# Patient Record
Sex: FEMALE | Race: WHITE | NOT HISPANIC OR LATINO | Employment: OTHER | URBAN - METROPOLITAN AREA
[De-identification: names, ages, dates, MRNs, and addresses within clinical notes are randomized per-mention and may not be internally consistent; named-entity substitution may affect disease eponyms.]

---

## 2018-02-05 ENCOUNTER — HOSPITAL ENCOUNTER (EMERGENCY)
Facility: MEDICAL CENTER | Age: 83
End: 2018-02-05
Attending: EMERGENCY MEDICINE
Payer: MEDICARE

## 2018-02-05 ENCOUNTER — APPOINTMENT (OUTPATIENT)
Dept: RADIOLOGY | Facility: MEDICAL CENTER | Age: 83
End: 2018-02-05
Attending: EMERGENCY MEDICINE
Payer: MEDICARE

## 2018-02-05 VITALS
TEMPERATURE: 98.7 F | RESPIRATION RATE: 16 BRPM | WEIGHT: 126 LBS | OXYGEN SATURATION: 97 % | SYSTOLIC BLOOD PRESSURE: 180 MMHG | HEART RATE: 79 BPM | DIASTOLIC BLOOD PRESSURE: 67 MMHG | HEIGHT: 61 IN | BODY MASS INDEX: 23.79 KG/M2

## 2018-02-05 DIAGNOSIS — S42.201A CLOSED FRACTURE OF PROXIMAL END OF RIGHT HUMERUS, UNSPECIFIED FRACTURE MORPHOLOGY, INITIAL ENCOUNTER: ICD-10-CM

## 2018-02-05 PROCEDURE — 73030 X-RAY EXAM OF SHOULDER: CPT | Mod: RT

## 2018-02-05 PROCEDURE — 99284 EMERGENCY DEPT VISIT MOD MDM: CPT

## 2018-02-05 RX ORDER — HYDROCODONE BITARTRATE AND ACETAMINOPHEN 5; 325 MG/1; MG/1
1-2 TABLET ORAL EVERY 4 HOURS PRN
Qty: 12 TAB | Refills: 0 | Status: SHIPPED | OUTPATIENT
Start: 2018-02-05 | End: 2018-02-09

## 2018-02-05 ASSESSMENT — PAIN SCALES - GENERAL: PAINLEVEL_OUTOF10: 8

## 2018-02-05 NOTE — ED TRIAGE NOTES
Wheeled to triage  Chief Complaint   Patient presents with   • T-5000 FALL     slipped and fell, hit her R shoulder, unalbe to lift arm, hx osteoperosis     Pt was in the hospital visiting her family and fell. Unable to lift R arm, sensation in tact.

## 2018-02-05 NOTE — ED PROVIDER NOTES
"ED Provider Note    CHIEF COMPLAINT  Chief Complaint   Patient presents with   • T-5000 FALL     slipped and fell, hit her R shoulder, unalbe to lift arm, hx osteoperosis       HPI  Tiffani Magana is a 89 y.o. female who presents after a trip and fall onto her right shoulder. She has pain with any movement at the shoulder but is fairly comfortable without better but she denies any other injury she denies any symptoms prior to the fall. She does have a history of previous shoulder injury what sounds like a proximal humerus fracture.    REVIEW OF SYSTEMS  See HPI for further details. ENT she denies head injury muscle skeletonized back pain and hip pain or other extremity injury All other systems are negative.    PAST MEDICAL HISTORY  No past medical history on file.    FAMILY HISTORY  No family history on file.    SOCIAL HISTORY  Social History     Social History   • Marital status:      Spouse name: N/A   • Number of children: N/A   • Years of education: N/A     Social History Main Topics   • Smoking status: Not on file   • Smokeless tobacco: Not on file   • Alcohol use Not on file   • Drug use: Unknown   • Sexual activity: Not on file     Other Topics Concern   • Not on file     Social History Narrative   • No narrative on file       SURGICAL HISTORY  Past Surgical History:   Procedure Laterality Date   • CERVICAL LAMINECTOMY POSTERIOR     • CHOLECYSTECTOMY     • HERNIA REPAIR     • HIP ORIF     • HYSTERECTOMY LAPAROSCOPY     • LUMBAR LAMINECTOMY DISKECTOMY         CURRENT MEDICATIONS  Home Medications    **Home medications have not yet been reviewed for this encounter**         ALLERGIES  Allergies   Allergen Reactions   • Aspirin    • Robaxin [Methocarbamol]      hallucinations       PHYSICAL EXAM  VITAL SIGNS: BP (!) 180/67   Pulse 79   Temp 37.1 °C (98.7 °F)   Resp 16   Ht 1.549 m (5' 1\")   Wt 57.2 kg (126 lb)   SpO2 97%   BMI 23.81 kg/m²     Constitutional: Well developed, Well nourished, No acute " distress, elderly   HENT: Normocephalic, Atraumatic, Bilateral external ears normal, mucous membranes moist, No oral exudates, Nose normal.   Eyes: PERRLA,  Conjunctiva and lids normal, No discharge.   Cardiovascular: Normal heart rate, Normal rhythm, No murmurs.   Thorax & Lungs: Normal breath sounds, No respiratory distress, No wheezing, or other abnormal breath sounds. No chest tenderness.   Abdomen: Bowel sounds normal, Soft, No tenderness, No masses, No pulsatile masses. No guarding.  Skin: Warm, Dry, No erythema, No rash.   Back: No tenderness, No CVA tenderness.  Extremities: Significant pain in the right shoulder with movement both actively and passively there appears to be significant swelling over the right shoulder series: Shoulder forward and sort of a positional more comfort there is no definite abnormality does not appear to be location neurovascular is intact  Musculoskeletal: Good range of motion in all major joints. No tenderness to palpation or major deformities, or injuries noted.   Neurologic: Alert & oriented x 3, Normal motor function, Normal sensory function, No focal deficits noted.         RADIOLOGY/PROCEDURES  DX-SHOULDER 2+ RIGHT   Final Result      1.  Comminuted and impacted fracture RIGHT proximal humerus involving surgical neck and head.   2.  No RIGHT shoulder dislocation.   3.  Probable subacute RIGHT anterior 5th and 6th rib fractures.   4.  Diffuse osteopenia.        COURSE & MEDICAL DECISION MAKING  Pertinent Labs & Imaging studies reviewed. (See chart for details)  Initial evaluation of x-rays by myself I felt there was possibly dislocation IV line was established in case we needed reduction however she does not appear to have dislocation seen appears to have an impacted fracture proximal humerus as discussed with the patient discussed with orthopedic surgeon who will see her tomorrow put her in a shoulder immobilizer prescribed pain medication. Patient and discussion with  patient about controlled substances p.m. P reviewed    FINAL IMPRESSION  1. Proximal humerus fracture   2.   3.       Electronically signed by: Rc Newell, 2/5/2018 9:43 PM

## 2018-02-06 NOTE — ED NOTES
Patient given discharge teaching and education. Verbalizes understanding. Given chance to ask questions, all questions answered. Educated patient of signs and symptoms to returned to ER, and educated patient they can return for any concerning symptoms. 1 prescriptions provided to patient. Education given to patient about medications. Patient states they have their belongings. Denies additional needs at this time. Reports pain is well controlled. Patient monitored every hour and PRN for safety and comfort. Patient out of department via wheel chair.

## 2019-12-05 ENCOUNTER — ANESTHESIA EVENT (OUTPATIENT)
Dept: SURGERY | Facility: MEDICAL CENTER | Age: 84
End: 2019-12-05
Payer: MEDICARE

## 2019-12-06 ENCOUNTER — ANESTHESIA (OUTPATIENT)
Dept: SURGERY | Facility: MEDICAL CENTER | Age: 84
End: 2019-12-06
Payer: MEDICARE

## 2019-12-06 ENCOUNTER — HOSPITAL ENCOUNTER (OUTPATIENT)
Facility: MEDICAL CENTER | Age: 84
End: 2019-12-06
Attending: OPHTHALMOLOGY | Admitting: OPHTHALMOLOGY
Payer: MEDICARE

## 2019-12-06 VITALS
OXYGEN SATURATION: 95 % | BODY MASS INDEX: 24.43 KG/M2 | HEART RATE: 81 BPM | TEMPERATURE: 99.1 F | SYSTOLIC BLOOD PRESSURE: 178 MMHG | HEIGHT: 61 IN | DIASTOLIC BLOOD PRESSURE: 71 MMHG | RESPIRATION RATE: 23 BRPM | WEIGHT: 129.41 LBS

## 2019-12-06 LAB
BASOPHILS # BLD AUTO: 1.2 % (ref 0–1.8)
BASOPHILS # BLD: 0.07 K/UL (ref 0–0.12)
BUN BLD-MCNC: 22 MG/DL (ref 8–22)
CA-I BLD ISE-SCNC: 1.26 MMOL/L (ref 1.1–1.3)
CHLORIDE BLD-SCNC: 105 MMOL/L (ref 96–112)
CO2 BLD-SCNC: 27 MMOL/L (ref 20–33)
CREAT BLD-MCNC: 0.9 MG/DL (ref 0.5–1.4)
EKG IMPRESSION: NORMAL
EOSINOPHIL # BLD AUTO: 0.12 K/UL (ref 0–0.51)
EOSINOPHIL NFR BLD: 2 % (ref 0–6.9)
ERYTHROCYTE [DISTWIDTH] IN BLOOD BY AUTOMATED COUNT: 48 FL (ref 35.9–50)
GLUCOSE BLD-MCNC: 88 MG/DL (ref 65–99)
HCT VFR BLD AUTO: 37.3 % (ref 37–47)
HCT VFR BLD CALC: 35 % (ref 37–47)
HGB BLD-MCNC: 11.7 G/DL (ref 12–16)
HGB BLD-MCNC: 11.9 G/DL (ref 12–16)
IMM GRANULOCYTES # BLD AUTO: 0.03 K/UL (ref 0–0.11)
IMM GRANULOCYTES NFR BLD AUTO: 0.5 % (ref 0–0.9)
LYMPHOCYTES # BLD AUTO: 1.8 K/UL (ref 1–4.8)
LYMPHOCYTES NFR BLD: 30.3 % (ref 22–41)
MCH RBC QN AUTO: 29.5 PG (ref 27–33)
MCHC RBC AUTO-ENTMCNC: 31.4 G/DL (ref 33.6–35)
MCV RBC AUTO: 94 FL (ref 81.4–97.8)
MONOCYTES # BLD AUTO: 0.48 K/UL (ref 0–0.85)
MONOCYTES NFR BLD AUTO: 8.1 % (ref 0–13.4)
NEUTROPHILS # BLD AUTO: 3.44 K/UL (ref 2–7.15)
NEUTROPHILS NFR BLD: 57.9 % (ref 44–72)
NRBC # BLD AUTO: 0 K/UL
NRBC BLD-RTO: 0 /100 WBC
PATHOLOGY CONSULT NOTE: NORMAL
PLATELET # BLD AUTO: 163 K/UL (ref 164–446)
PMV BLD AUTO: 9.5 FL (ref 9–12.9)
POTASSIUM BLD-SCNC: 4.6 MMOL/L (ref 3.6–5.5)
RBC # BLD AUTO: 3.97 M/UL (ref 4.2–5.4)
SODIUM BLD-SCNC: 139 MMOL/L (ref 135–145)
WBC # BLD AUTO: 5.9 K/UL (ref 4.8–10.8)

## 2019-12-06 PROCEDURE — 85014 HEMATOCRIT: CPT

## 2019-12-06 PROCEDURE — 700101 HCHG RX REV CODE 250: Performed by: OPHTHALMOLOGY

## 2019-12-06 PROCEDURE — 80047 BASIC METABLC PNL IONIZED CA: CPT

## 2019-12-06 PROCEDURE — 160025 RECOVERY II MINUTES (STATS): Performed by: OPHTHALMOLOGY

## 2019-12-06 PROCEDURE — 160041 HCHG SURGERY MINUTES - EA ADDL 1 MIN LEVEL 4: Performed by: OPHTHALMOLOGY

## 2019-12-06 PROCEDURE — 85025 COMPLETE CBC W/AUTO DIFF WBC: CPT

## 2019-12-06 PROCEDURE — 700105 HCHG RX REV CODE 258: Performed by: OPHTHALMOLOGY

## 2019-12-06 PROCEDURE — 501838 HCHG SUTURE GENERAL: Performed by: OPHTHALMOLOGY

## 2019-12-06 PROCEDURE — 160048 HCHG OR STATISTICAL LEVEL 1-5: Performed by: OPHTHALMOLOGY

## 2019-12-06 PROCEDURE — 88305 TISSUE EXAM BY PATHOLOGIST: CPT | Mod: 59

## 2019-12-06 PROCEDURE — 160009 HCHG ANES TIME/MIN: Performed by: OPHTHALMOLOGY

## 2019-12-06 PROCEDURE — 160047 HCHG PACU  - EA ADDL 30 MINS PHASE II: Performed by: OPHTHALMOLOGY

## 2019-12-06 PROCEDURE — 160046 HCHG PACU - 1ST 60 MINS PHASE II: Performed by: OPHTHALMOLOGY

## 2019-12-06 PROCEDURE — 160029 HCHG SURGERY MINUTES - 1ST 30 MINS LEVEL 4: Performed by: OPHTHALMOLOGY

## 2019-12-06 PROCEDURE — 700111 HCHG RX REV CODE 636 W/ 250 OVERRIDE (IP): Performed by: ANESTHESIOLOGY

## 2019-12-06 PROCEDURE — 93005 ELECTROCARDIOGRAM TRACING: CPT | Performed by: ANESTHESIOLOGY

## 2019-12-06 PROCEDURE — 93010 ELECTROCARDIOGRAM REPORT: CPT | Performed by: INTERNAL MEDICINE

## 2019-12-06 RX ORDER — TETRACAINE HYDROCHLORIDE 5 MG/ML
SOLUTION OPHTHALMIC
Status: DISCONTINUED
Start: 2019-12-06 | End: 2019-12-06 | Stop reason: HOSPADM

## 2019-12-06 RX ORDER — TOBRAMYCIN AND DEXAMETHASONE 3; 1 MG/ML; MG/ML
SUSPENSION/ DROPS OPHTHALMIC
Status: DISCONTINUED | OUTPATIENT
Start: 2019-12-06 | End: 2019-12-06 | Stop reason: HOSPADM

## 2019-12-06 RX ORDER — ERYTHROMYCIN 5 MG/G
OINTMENT OPHTHALMIC
Status: DISCONTINUED
Start: 2019-12-06 | End: 2019-12-06 | Stop reason: HOSPADM

## 2019-12-06 RX ORDER — LIDOCAINE HYDROCHLORIDE AND EPINEPHRINE BITARTRATE 20; .01 MG/ML; MG/ML
INJECTION, SOLUTION SUBCUTANEOUS
Status: DISCONTINUED
Start: 2019-12-06 | End: 2019-12-06 | Stop reason: HOSPADM

## 2019-12-06 RX ORDER — LIDOCAINE HYDROCHLORIDE AND EPINEPHRINE BITARTRATE 20; .01 MG/ML; MG/ML
INJECTION, SOLUTION SUBCUTANEOUS
Status: DISCONTINUED | OUTPATIENT
Start: 2019-12-06 | End: 2019-12-06 | Stop reason: HOSPADM

## 2019-12-06 RX ORDER — BUPIVACAINE HYDROCHLORIDE 5 MG/ML
INJECTION, SOLUTION EPIDURAL; INTRACAUDAL
Status: DISCONTINUED
Start: 2019-12-06 | End: 2019-12-06 | Stop reason: HOSPADM

## 2019-12-06 RX ORDER — BUPIVACAINE HYDROCHLORIDE AND EPINEPHRINE 5; 5 MG/ML; UG/ML
INJECTION, SOLUTION PERINEURAL
Status: DISCONTINUED | OUTPATIENT
Start: 2019-12-06 | End: 2019-12-06 | Stop reason: HOSPADM

## 2019-12-06 RX ORDER — SODIUM CHLORIDE, SODIUM LACTATE, POTASSIUM CHLORIDE, CALCIUM CHLORIDE 600; 310; 30; 20 MG/100ML; MG/100ML; MG/100ML; MG/100ML
INJECTION, SOLUTION INTRAVENOUS CONTINUOUS
Status: DISCONTINUED | OUTPATIENT
Start: 2019-12-06 | End: 2019-12-06 | Stop reason: HOSPADM

## 2019-12-06 RX ORDER — LISINOPRIL 5 MG/1
5 TABLET ORAL DAILY
COMMUNITY

## 2019-12-06 RX ORDER — TETRACAINE HYDROCHLORIDE 5 MG/ML
SOLUTION OPHTHALMIC
Status: DISCONTINUED | OUTPATIENT
Start: 2019-12-06 | End: 2019-12-06 | Stop reason: HOSPADM

## 2019-12-06 RX ORDER — ERYTHROMYCIN 5 MG/G
OINTMENT OPHTHALMIC
Status: DISCONTINUED | OUTPATIENT
Start: 2019-12-06 | End: 2019-12-06 | Stop reason: HOSPADM

## 2019-12-06 RX ORDER — ONDANSETRON 2 MG/ML
4 INJECTION INTRAMUSCULAR; INTRAVENOUS
Status: DISCONTINUED | OUTPATIENT
Start: 2019-12-06 | End: 2019-12-06 | Stop reason: HOSPADM

## 2019-12-06 RX ADMIN — ALFENTANIL HYDROCHLORIDE 75 MCG: 500 INJECTION INTRAVENOUS at 14:47

## 2019-12-06 RX ADMIN — LIDOCAINE HYDROCHLORIDE 60 MG: 20 INJECTION, SOLUTION INFILTRATION; PERINEURAL at 14:48

## 2019-12-06 RX ADMIN — ALFENTANIL HYDROCHLORIDE 75 MCG: 500 INJECTION INTRAVENOUS at 15:11

## 2019-12-06 RX ADMIN — ALFENTANIL HYDROCHLORIDE 75 MCG: 500 INJECTION INTRAVENOUS at 14:59

## 2019-12-06 RX ADMIN — ALFENTANIL HYDROCHLORIDE 50 MCG: 500 INJECTION INTRAVENOUS at 15:23

## 2019-12-06 RX ADMIN — ALFENTANIL HYDROCHLORIDE 75 MCG: 500 INJECTION INTRAVENOUS at 15:07

## 2019-12-06 RX ADMIN — ALFENTANIL HYDROCHLORIDE 50 MCG: 500 INJECTION INTRAVENOUS at 15:38

## 2019-12-06 RX ADMIN — PROPOFOL 75 MCG/KG/MIN: 10 INJECTION, EMULSION INTRAVENOUS at 14:51

## 2019-12-06 RX ADMIN — SODIUM CHLORIDE, POTASSIUM CHLORIDE, SODIUM LACTATE AND CALCIUM CHLORIDE: 600; 310; 30; 20 INJECTION, SOLUTION INTRAVENOUS at 13:00

## 2019-12-06 RX ADMIN — PROPOFOL 40 MG: 10 INJECTION, EMULSION INTRAVENOUS at 14:49

## 2019-12-06 SDOH — HEALTH STABILITY: MENTAL HEALTH: HOW OFTEN DO YOU HAVE A DRINK CONTAINING ALCOHOL?: MONTHLY OR LESS

## 2019-12-06 ASSESSMENT — PAIN SCALES - GENERAL: PAIN_LEVEL: 0

## 2019-12-06 NOTE — ANESTHESIA QCDR
2019 [facility] Clinical Data Registry (for Quality Improvement)     Postoperative nausea/vomiting risk protocol (Adult = 18 yrs and Pediatric 3-17 yrs)- (430 and 463)  General inhalation anesthetic (NOT TIVA) with PONV risk factors: No  Provision of anti-emetic therapy with at least 2 different classes of agents: N/A  Patient DID NOT receive anti-emetic therapy and reason is documented in Medical Record: N/A    Multimodal Pain Management- (AQI59)  Patient undergoing Elective Surgery (i.e. Outpatient, or ASC, or Prescheduled Surgery prior to Hospital Admission): Yes  Use of Multimodal Pain Management, two or more drugs and/or interventions, NOT including systemic opioids: Yes   Exception: Documented allergy to multiple classes of analgesics:  N/A    PACU assessment of acute postoperative pain prior to Anesthesia Care End- Applies to Patients Age = 18- (ABG7)  Initial PACU pain score is which of the following: < 7/10  Patient unable to report pain score: N/A    Post-anesthetic transfer of care checklist/protocol to PACU/ICU- (426 and 427)  Upon conclusion of case, patient transferred to which of the following locations: PACU/Non-ICU  Use of transfer checklist/protocol: Yes  Exclusion: Service Performed in Patient Hospital Room (and thus did not require transfer): N/A    PACU Reintubation- (AQI31)  General anesthesia requiring endotracheal intubation (ETT) along with subsequent extubation in OR or PACU: No  Required reintubation in the PACU: N/A  Extubation was a planned trial documented in the medical record prior to removal of the original airway device: N/A    Unplanned admission to ICU related to anesthesia service up through end of PACU care- (MD51)  Unplanned admission to ICU (not initially anticipated at anesthesia start time): No

## 2019-12-06 NOTE — ANESTHESIA TIME REPORT
Anesthesia Start and Stop Event Times     Date Time Event    12/6/2019 1438 Ready for Procedure     1441 Anesthesia Start     1553 Anesthesia Stop        Responsible Staff  12/06/19    Name Role Begin End    Warner [place] M.D. 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 Alert    Comments:

## 2019-12-06 NOTE — ANESTHESIA PREPROCEDURE EVALUATION
Relevant Problems   No relevant active problems       Physical Exam    Airway   Mallampati: II  TM distance: >3 FB  Neck ROM: full       Cardiovascular - normal exam  Rhythm: regular  Rate: normal  (-) murmur     Dental - normal exam         Pulmonary - normal exam  Breath sounds clear to auscultation     Abdominal    Neurological     Comments: A&Ox4, grossly intact             Anesthesia Plan    ASA 2       Plan - MAC and general           Plan Factors:   Patient was not previously instructed to abstain from smoking on day of procedure.      Induction: intravenous      Pertinent diagnostic labs and testing reviewed    Informed Consent:    Anesthetic plan and risks discussed with patient.    Use of blood products discussed with: patient whom consented to blood products.

## 2019-12-06 NOTE — ANESTHESIA POSTPROCEDURE EVALUATION
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 Responsible Provider:  Warner Montana M.D.    Anesthesia Type:  MAC, general ASA Status:  2          Final Anesthesia Type: MAC, general  Last vitals  BP   NIBP: 156/70    Temp   37.3 °C (99.1 °F)    Pulse   Pulse: 73   Resp   16    SpO2   96 %      Anesthesia Post Evaluation    Patient location during evaluation: PACU  Patient participation: complete - patient participated  Level of consciousness: awake and alert  Pain score: 0    Airway patency: patent  Anesthetic complications: no  Cardiovascular status: hemodynamically stable  Respiratory status: acceptable  Hydration status: euvolemic    PONV: none           Nurse Pain Score: 0 (NPRS)

## 2019-12-06 NOTE — OR NURSING
1550-Received via [facility] from OR to phase II.  A/A/O x4.  RR even, unlabored.  Ointment to bilateral eyes, buts states able to see.    1600-Report to Jennifer PEREZ.

## 2019-12-07 NOTE — OR NURSING
1600-assumed care of patient. Report received from ANA Gallardo.    1650-sitting up drinking juice. Son at bedside.    1730-meets discharge criteria. Iv removed. Instructions explained to son and patient.  All questions answered. Discharged home with responsible party. Escorted to car via wheelchair.

## 2019-12-09 NOTE — OP REPORT
DATE OF SERVICE:  12/06/2019    PREOPERATIVE DIAGNOSES:  1.  Bilateral lower eyelid medial ectropion and lateral horizontal laxity.  2.  Right nasal sidewall skin lesions x2.    POSTOPERATIVE DIAGNOSES:  1.  Bilateral lower eyelid medial ectropion and lateral horizontal laxity.  2.  Right nasal sidewall skin lesions x2.    PROCEDURES PERFORMED:  1.  Bilateral lower eyelid ectropion repair, suture with medial spindle.  2.  Bilateral lower eyelid full thickness resection greater than 1/4 eyelid.  3.  Right nasal superior sidewall lesion incisional biopsy.  4.  Right nasal sidewall inferior skin lesion incisional biopsy.    SURGEON:  Leo Craig MD    ASSISTANT:  None.    SPECIMENS:  1.  Right nasal sidewall superior lesion.  2.  Right nasal sidewall inferiorly lesion.    COMPLICATIONS:  None.    IMPLANTS:  None.    ESTIMATED BLOOD LOSS:  Approximately 5 mL    INDICATIONS:  The patient is a 89-year-old female who was referred for   bilateral lower eyelid ectropion.  This has been going on for approximately 10   years and become progressively worse to the point where she feels like it is   bothering her vision at this time.  Physical exam revealed vision to be 20   count fingers in the right and 20/50 in the left.  Her decreased visual acuity   in the right eye is secondary to macular degeneration.  It was noted that she   has bilateral medial ectropion with punctal eversion as well as 1+ laxity in   the right and 2+ horizontal laxity in the left.  It was discussed with the   patient that her bilateral medial ectropion, left worse than the right,   secondary to the above-stated causes and that she would benefit from   horizontal tightening and suture eversion with medial spindle to assist with   the apposition of the eyelid against the globe.  The risks, benefits, and   alternatives were discussed with the patient at length in the clinic and she   elected to proceed with scheduling surgery.  The right nasal  sidewall also   exhibited 2 areas of skin changes concerning for actinic keratosis or early   squamous cell carcinoma.  It was also discussed that she would benefit from   excisional biopsy to rule out skin cancer.    The patient was met in the preoperative holding area where the surgical site   was marked, H and P updated, and consent was obtained.  Upon further   contestation of the patient's questions, which were answered affirmatively,   the patient was taken to the operating room.    DESCRIPTION OF PROCEDURE:  The patient was placed in the supine position.    Necessary cardiopulmonary monitors were attached by anesthesia.  Monitored   anesthesia care was then delivered by the anesthesiologist.  Local injection   consisting of 1:1 ratio of 2% lidocaine, 1:100,000 epinephrine, 0.5%   bupivacaine with 1:200,000 epinephrine was then injected into bilateral lower   eyelid and lateral canthus.  The right nasal sidewall lesions were also   injected with the same 1:1 ratio of local anesthetic.  The patient was then   prepped and draped in normal sterile fashion for oculoplastic surgery   following a timeout procedure.    A #15 Bard-Matheus blade was then used to incise the right lateral canthus.    Hemostasis was obtained with bipolar cautery.  Monopolar cautery was then used   to perform a cantholysis.  A bryan-shaped excision approximately 5 mm high   x 5 mm in width was then removed from the posterior aspect of the eyelid using   monopolar cautery directly centered inferiorly to the lower eyelid punctum.    The lower eyelid retractors were then bluntly dissected from the overlying   preseptal tissues.  A 5-0 chromic suture double arm was then passed   transconjunctivally in engaging the lateral retractors and then passing   through the superior conjunctiva, then back through the lower eyelid   conjunctiva retractors and externalized approximately 3 mm inferior from the   lower eyelid margin.  This was done with  both arms of the chromic sutures.  A   monopolar cautery was then used to christie the extends of horizontal laxity of   the lower eyelid.  The marginal epithelium was then denuded with monopolar   cautery.  The excessive horizontal laxity was measured approximately 6 mm in   length.  Monopolar cautery was then used to divide the anterior and posterior   lamella.  The tarsal tongue was then divided from the inferior conjunctiva   with monopolar cautery.  A #15 Bard-Matheus blade was then used to   de-epithelialize the posterior conjunctival surface.  A double-arm 4-0   Mersilene suture was then passed through the tarsus in a locking manner as to   engage the superior and inferior aspect of the tarsus.  A 5-0 Vicryl suture   was then passed through the intact superior ramus of the lateral canthal   tendon and then through the inferior tarsus through the eyelid margin at   approximately the gray line.  The lateral orbital rim was then dissected   bluntly and with monopolar cautery.  The 4-0 Mersilene suture was then placed   through the lateral orbital rim above the level of Whitnall's tubercle.  This   was then tied.  The 5-0 Vicryl suture was then tied to recreate the lateral   canthal angle.  The excessive tarsus was then excised.  A 5-0 Vicryl suture   was then placed into the superficial orbicularis of the lateral orbital rim in   a buried interrupted fashion.  Skin closure was performed with a running 6-0   plain gut suture.  The 5-0 chromic suture on the medial spindle was then tied   in a quilting knot fashion.    A similar procedure was performed on the left side without variance.    The 2 right nasal sidewall lesions were then measured approximately 5 mm in   length.  An ellipse was marked around the suspected lesion as well as normal   skin.  A #15 Bard-Matheus blade was then used to incise the skin.  The biopsy   was then completed with sharp Erin scissors.  Hemostasis was obtained with   bipolar cautery.   The superior and inferior nasal sidewall lesions were then   closed with a running 6-0 plain gut suture.    The patient tolerated the procedure well and was taken to the PACU in stable   condition where she was later discharged home without event.    I, Dr. Leo Craig, was present and performed the entirety of the procedure   without assistance.       ____________________________________     MD MAGUI Wallace / GARCÍA    DD:  12/08/2019 20:48:37  DT:  12/08/2019 22:03:24    D#:  [id number]  Job#:  [id number]

## 2020-06-24 PROBLEM — M81.0 OSTEOPOROSIS: Status: ACTIVE | Noted: 2020-06-24

## (undated) DEVICE — CLOSURE SKIN STRIP 1/2 X 4 IN - (STERI STRIP) (50/BX 4BX/CA)

## (undated) DEVICE — GLOVES, #7 1/2 BIOGEL M

## (undated) DEVICE — NEEDLE  NON-SAFETY 30GA X 3/4 IN (10EA/CA)

## (undated) DEVICE — CANISTER SUCTION 3000ML MECHANICAL FILTER AUTO SHUTOFF MEDI-VAC NONSTERILE LF DISP  (40EA/CA)

## (undated) DEVICE — GOWN WARMING STANDARD FLEX - (30/CA)

## (undated) DEVICE — GLOVE SZ 6 BIOGEL PI MICRO - PF LF (50PR/BX 4BX/CA)

## (undated) DEVICE — CATHETER IV 20 GA X 1-1/4 ---SURG.& SDS ONLY--- (50EA/BX)

## (undated) DEVICE — SUTURE 5-0 MERSIL S-14 (12PK/BX)

## (undated) DEVICE — SET LEADWIRE 5 LEAD BEDSIDE DISPOSABLE ECG (1SET OF 5/EA)

## (undated) DEVICE — LACTATED RINGERS INJ 1000 ML - (14EA/CA 60CA/PF)

## (undated) DEVICE — KIT  I.V. START (100EA/CA)

## (undated) DEVICE — PENCIL ELECTSURG 10FT BTN SWH - (50/CA)

## (undated) DEVICE — SYRINGE SAFETY 10 ML 18 GA X 1 1/2 BLUNT LL (100/BX 4BX/CA)

## (undated) DEVICE — WATER IRRIGATION STERILE 1000ML (12EA/CA)

## (undated) DEVICE — SUTURE 6-0 PLAIN GUT G-1 D/A 18 (12PK/BX)"

## (undated) DEVICE — SENSOR SPO2 NEO LNCS ADHESIVE (20/BX) SEE USER NOTES

## (undated) DEVICE — PEN SKIN MARKER W/RULER - (50EA/BX)

## (undated) DEVICE — KIT ANESTHESIA W/CIRCUIT & 3/LT BAG W/FILTER (20EA/CA)

## (undated) DEVICE — ELECTRODE 850 FOAM ADHESIVE - HYDROGEL RADIOTRNSPRNT (50/PK)

## (undated) DEVICE — CANISTER SUCTION RIGID RED 1500CC (40EA/CA)

## (undated) DEVICE — SUCTION INSTRUMENT YANKAUER BULBOUS TIP W/O VENT (50EA/CA)

## (undated) DEVICE — SUTURE GENERAL

## (undated) DEVICE — TUBE CONNECTING SUCTION - CLEAR PLASTIC STERILE 72 IN (50EA/CA)

## (undated) DEVICE — SLEEVE, VASO, THIGH, MED

## (undated) DEVICE — TUBING CLEARLINK DUO-VENT - C-FLO (48EA/CA)